# Patient Record
Sex: MALE | Race: WHITE | NOT HISPANIC OR LATINO
[De-identification: names, ages, dates, MRNs, and addresses within clinical notes are randomized per-mention and may not be internally consistent; named-entity substitution may affect disease eponyms.]

---

## 2022-05-02 PROBLEM — Z00.00 ENCOUNTER FOR PREVENTIVE HEALTH EXAMINATION: Status: ACTIVE | Noted: 2022-05-02

## 2022-05-18 ENCOUNTER — APPOINTMENT (OUTPATIENT)
Dept: COLORECTAL SURGERY | Facility: CLINIC | Age: 67
End: 2022-05-18
Payer: COMMERCIAL

## 2022-05-18 VITALS
BODY MASS INDEX: 24.52 KG/M2 | RESPIRATION RATE: 14 BRPM | WEIGHT: 185 LBS | TEMPERATURE: 98.4 F | HEART RATE: 72 BPM | SYSTOLIC BLOOD PRESSURE: 148 MMHG | HEIGHT: 73 IN | DIASTOLIC BLOOD PRESSURE: 78 MMHG

## 2022-05-18 DIAGNOSIS — Z86.79 PERSONAL HISTORY OF OTHER DISEASES OF THE CIRCULATORY SYSTEM: ICD-10-CM

## 2022-05-18 DIAGNOSIS — N39.0 URINARY TRACT INFECTION, SITE NOT SPECIFIED: ICD-10-CM

## 2022-05-18 DIAGNOSIS — Z86.010 PERSONAL HISTORY OF COLONIC POLYPS: ICD-10-CM

## 2022-05-18 DIAGNOSIS — K57.30 DIVERTICULOSIS OF LARGE INTESTINE W/OUT PERFORATION OR ABSCESS W/OUT BLEEDING: ICD-10-CM

## 2022-05-18 DIAGNOSIS — N32.1 VESICOINTESTINAL FISTULA: ICD-10-CM

## 2022-05-18 DIAGNOSIS — Z82.49 FAMILY HISTORY OF ISCHEMIC HEART DISEASE AND OTHER DISEASES OF THE CIRCULATORY SYSTEM: ICD-10-CM

## 2022-05-18 DIAGNOSIS — Z72.89 OTHER PROBLEMS RELATED TO LIFESTYLE: ICD-10-CM

## 2022-05-18 DIAGNOSIS — Z87.438 PERSONAL HISTORY OF OTHER DISEASES OF MALE GENITAL ORGANS: ICD-10-CM

## 2022-05-18 DIAGNOSIS — Z87.891 PERSONAL HISTORY OF NICOTINE DEPENDENCE: ICD-10-CM

## 2022-05-18 PROCEDURE — 99204 OFFICE O/P NEW MOD 45 MIN: CPT

## 2022-05-18 RX ORDER — NEOMYCIN SULFATE 500 MG/1
500 TABLET ORAL
Qty: 3 | Refills: 0 | Status: ACTIVE | COMMUNITY
Start: 2022-05-18 | End: 1900-01-01

## 2022-05-18 RX ORDER — AMLODIPINE BESYLATE 10 MG/1
10 TABLET ORAL
Refills: 0 | Status: ACTIVE | COMMUNITY

## 2022-05-18 RX ORDER — TAMSULOSIN HYDROCHLORIDE 0.4 MG/1
0.4 CAPSULE ORAL
Refills: 0 | Status: ACTIVE | COMMUNITY

## 2022-05-18 RX ORDER — METOPROLOL SUCCINATE 200 MG/1
TABLET, EXTENDED RELEASE ORAL
Refills: 0 | Status: ACTIVE | COMMUNITY

## 2022-05-18 RX ORDER — METRONIDAZOLE 500 MG/1
500 TABLET ORAL
Qty: 3 | Refills: 0 | Status: ACTIVE | COMMUNITY
Start: 2022-05-18 | End: 1900-01-01

## 2022-05-18 RX ORDER — LOSARTAN POTASSIUM AND HYDROCHLOROTHIAZIDE 25; 100 MG/1; MG/1
100-25 TABLET ORAL
Refills: 0 | Status: ACTIVE | COMMUNITY

## 2022-05-19 NOTE — PHYSICAL EXAM
[Normal Breath Sounds] : Normal breath sounds [Normal Heart Sounds] : normal heart sounds [No Edema] : No edema [Alert] : alert [Oriented to Person] : oriented to person [Oriented to Place] : oriented to place [Oriented to Time] : oriented to time [Calm] : calm [de-identified] : flat +BS NT/ND [de-identified] : NC/AT [de-identified] : +ROM [de-identified] : intact

## 2022-05-19 NOTE — REVIEW OF SYSTEMS
[Loss Of Hearing] : hearing loss [As Noted in HPI] : as noted in HPI [Negative] : Endocrine [Chest Pain] : no chest pain [Shortness Of Breath] : no shortness of breath [Easy Bleeding] : no tendency for easy bleeding [Easy Bruising] : no tendency for easy bruising [FreeTextEntry7] : daily BM

## 2022-05-19 NOTE — ASSESSMENT
[FreeTextEntry1] : Very pleasant 67-year-old male  who presents for consultation regarding a colovesical fistula.\par \par The patient had his first bout of CAT scan proven diverticulitis approximately 8 years ago. Over the last one to one and one half years the patient developed multiple urinary tract infections. He finally sought treatment from a urologist. CAT scan was performed confirming the presence of a colovesical fistula. Cystoscopy was also performed confirming the fistula. A colonoscopy was done revealing sigmoid diverticulosis and some benign polyps,, which were removed. This was his initial colonoscopy.\par \par The patient has never had prior abdominal surgery. Past medical history is significant for hypertension.\par \par We discussed the etiology and treatment of acute diverticulitis. The patient certainly requires resection. We talked about a laparoscopic assisted approach including takedown and repair of the fistula along with a sigmoid resection. We talked about anticipated operative time, hospital stay and time to complete recuperation. Risks, alternatives and benefits including but not limited to an anastomotic leak, wound infection and deep venous thrombosis were discussed.\par \par Questions were answered and he wished to proceed with surgical scheduling.

## 2022-05-19 NOTE — HISTORY OF PRESENT ILLNESS
[FreeTextEntry1] : 68yo WM hospitalized @8yrs ago with CT proven diverticulitis. Treated with IV ABX. Discharged on oral ABX.\par \par Over the past 2yrs pt has been treated multiple times for UTIs (started with urolgy @1.5yrs ago). April 2022 urology ordered CT scan secondary to complaints of fecaluria/pneumaturia/burning on urination. This revealed a colovesical fistula. Cystoscopy performed revealing bladder abnormality. F/U colonoscopy (initial) revealed moderate left sided diverticulosis and multiple colonic polyps, which were retrieved (path benign). Advised to schedule colorectal surgical consultation.

## 2022-05-23 ENCOUNTER — OUTPATIENT (OUTPATIENT)
Dept: OUTPATIENT SERVICES | Facility: HOSPITAL | Age: 67
LOS: 1 days | End: 2022-05-23
Payer: MEDICARE

## 2022-05-23 VITALS
HEIGHT: 73 IN | RESPIRATION RATE: 16 BRPM | HEART RATE: 70 BPM | SYSTOLIC BLOOD PRESSURE: 153 MMHG | TEMPERATURE: 98 F | OXYGEN SATURATION: 96 % | WEIGHT: 177.91 LBS | DIASTOLIC BLOOD PRESSURE: 86 MMHG

## 2022-05-23 DIAGNOSIS — N32.1 VESICOINTESTINAL FISTULA: ICD-10-CM

## 2022-05-23 DIAGNOSIS — Z98.890 OTHER SPECIFIED POSTPROCEDURAL STATES: Chronic | ICD-10-CM

## 2022-05-23 DIAGNOSIS — Z01.818 ENCOUNTER FOR OTHER PREPROCEDURAL EXAMINATION: ICD-10-CM

## 2022-05-23 DIAGNOSIS — Z29.9 ENCOUNTER FOR PROPHYLACTIC MEASURES, UNSPECIFIED: ICD-10-CM

## 2022-05-23 LAB
ANION GAP SERPL CALC-SCNC: 13 MMOL/L — SIGNIFICANT CHANGE UP (ref 5–17)
BLD GP AB SCN SERPL QL: NEGATIVE — SIGNIFICANT CHANGE UP
BUN SERPL-MCNC: 14 MG/DL — SIGNIFICANT CHANGE UP (ref 7–23)
CALCIUM SERPL-MCNC: 9.8 MG/DL — SIGNIFICANT CHANGE UP (ref 8.4–10.5)
CHLORIDE SERPL-SCNC: 95 MMOL/L — LOW (ref 96–108)
CO2 SERPL-SCNC: 26 MMOL/L — SIGNIFICANT CHANGE UP (ref 22–31)
CREAT SERPL-MCNC: 0.92 MG/DL — SIGNIFICANT CHANGE UP (ref 0.5–1.3)
EGFR: 91 ML/MIN/1.73M2 — SIGNIFICANT CHANGE UP
GLUCOSE SERPL-MCNC: 96 MG/DL — SIGNIFICANT CHANGE UP (ref 70–99)
HCT VFR BLD CALC: 36.6 % — LOW (ref 39–50)
HGB BLD-MCNC: 11.7 G/DL — LOW (ref 13–17)
MCHC RBC-ENTMCNC: 25.5 PG — LOW (ref 27–34)
MCHC RBC-ENTMCNC: 32 GM/DL — SIGNIFICANT CHANGE UP (ref 32–36)
MCV RBC AUTO: 79.7 FL — LOW (ref 80–100)
NRBC # BLD: 0 /100 WBCS — SIGNIFICANT CHANGE UP (ref 0–0)
PLATELET # BLD AUTO: 325 K/UL — SIGNIFICANT CHANGE UP (ref 150–400)
POTASSIUM SERPL-MCNC: 3.6 MMOL/L — SIGNIFICANT CHANGE UP (ref 3.5–5.3)
POTASSIUM SERPL-SCNC: 3.6 MMOL/L — SIGNIFICANT CHANGE UP (ref 3.5–5.3)
RBC # BLD: 4.59 M/UL — SIGNIFICANT CHANGE UP (ref 4.2–5.8)
RBC # FLD: 16.2 % — HIGH (ref 10.3–14.5)
RH IG SCN BLD-IMP: POSITIVE — SIGNIFICANT CHANGE UP
SODIUM SERPL-SCNC: 134 MMOL/L — LOW (ref 135–145)
WBC # BLD: 8.2 K/UL — SIGNIFICANT CHANGE UP (ref 3.8–10.5)
WBC # FLD AUTO: 8.2 K/UL — SIGNIFICANT CHANGE UP (ref 3.8–10.5)

## 2022-05-23 PROCEDURE — 87186 SC STD MICRODIL/AGAR DIL: CPT

## 2022-05-23 PROCEDURE — 86900 BLOOD TYPING SEROLOGIC ABO: CPT

## 2022-05-23 PROCEDURE — 86901 BLOOD TYPING SEROLOGIC RH(D): CPT

## 2022-05-23 PROCEDURE — 80048 BASIC METABOLIC PNL TOTAL CA: CPT

## 2022-05-23 PROCEDURE — 87086 URINE CULTURE/COLONY COUNT: CPT

## 2022-05-23 PROCEDURE — 85027 COMPLETE CBC AUTOMATED: CPT

## 2022-05-23 PROCEDURE — 86850 RBC ANTIBODY SCREEN: CPT

## 2022-05-23 PROCEDURE — G0463: CPT

## 2022-05-23 PROCEDURE — 83036 HEMOGLOBIN GLYCOSYLATED A1C: CPT

## 2022-05-23 RX ORDER — CEFOTETAN DISODIUM 1 G
2 VIAL (EA) INJECTION ONCE
Refills: 0 | Status: DISCONTINUED | OUTPATIENT
Start: 2022-06-07 | End: 2022-06-07

## 2022-05-23 NOTE — H&P PST ADULT - HISTORY OF PRESENT ILLNESS
66 YO M PMH HTN, diverticulitis (proven on CT scan ~8 years ago), recurrent UTIs over the last 1-1.5 years, CT scan revealed colovesical fistula confirmed by cystoscopy, colonoscopy revealed sigmoid diverticulosis & benign polyps s/p removal, presents to [facility] for LAPAROSCOPIC LOW ANTERIOR RESECTION ERAS 6/7/2022. Denies any palpitations, SOB, N/V, Covid symptoms (fever, chills, cough) or exposure.     ***Covid test scheduled for 6/4/2022 at [facility].

## 2022-05-23 NOTE — H&P PST ADULT - PROBLEM SELECTOR PLAN 1
ERAS  LAPAROSCOPIC LOW ANTERIOR RESECTION  Pre-op education provided - all questions answered   Chlorhex soap & instructions given  CBC, CMP, T&S, Ha1c, U cx sent in PST  Continue losartan & metoprolol DOS as prescribed ERAS  LAPAROSCOPIC LOW ANTERIOR RESECTION  Pre-op education provided - all questions answered   Chlorhex soap & instructions given  Incentive spirometer & instructions provided  CBC, BMP, T&S, Ha1c, Ucx sent in PST  Continue losartan & metoprolol DOS as prescribed

## 2022-05-23 NOTE — H&P PST ADULT - FALL HARM RISK - UNIVERSAL INTERVENTIONS
Bed in lowest position, wheels locked, appropriate side rails in place/Call bell, personal items and telephone in reach/Instruct patient to call for assistance before getting out of bed or chair/Non-slip footwear when patient is out of bed/Pound to call system/Physically safe environment - no spills, clutter or unnecessary equipment/Purposeful Proactive Rounding/Room/bathroom lighting operational, light cord in reach

## 2022-05-23 NOTE — H&P PST ADULT - NSANTHBMIRD_ENT_A_CORE
TRANSFER - IN REPORT:    Verbal report received from [place] [id number] on Maurice  being received from cath lab procedure area  for routine progression of care. Report consisted of patients Situation, Background, Assessment and Recommendations(SBAR). Information from the following report(s) Kardex and Procedure Summary was reviewed with the receiving clinician. Opportunity for questions and clarification was provided. Assessment completed upon patients arrival to [address] [place] [address] and care assumed. Cardiac Cath Lab Recovery Arrival Note:    Maurice arrived to [facility] recovery area. Patient procedure= CV. Patient on cardiac monitor, non-invasive blood pressure, SPO2 monitor. On  O2 @ 2 lpm via NC.  IV  of NS on pump at 25 ml/hr. Patient status doing well without problems. Patient is A&Ox 3. Patient reports no pain. PROCEDURE SITE CHECK:    Procedure site:  none    No change in patient status. Continue to monitor patient and status. No

## 2022-05-23 NOTE — H&P PST ADULT - NSANTHOSAYNRD_GEN_A_CORE
No. GERMÁN screening performed.  STOP BANG Legend: 0-2 = LOW Risk; 3-4 = INTERMEDIATE Risk; 5-8 = HIGH Risk

## 2022-05-23 NOTE — H&P PST ADULT - NSICDXPASTMEDICALHX_GEN_ALL_CORE_FT
PAST MEDICAL HISTORY:  Colonic polyp     Hemorrhoid     History of BPH     History of recurrent UTIs     HTN (hypertension)

## 2022-05-23 NOTE — H&P PST ADULT - PRIMARY CARE PROVIDER
Yoselyn Adan  Yoselyn Adan  - preop eval 5/28/2022 (to be faxed to [facility])

## 2022-05-23 NOTE — H&P PST ADULT - ASSESSMENT
LUCYI VTE 2.0 SCORE [CLOT updated 2019]    AGE RELATED RISK FACTORS                                                       MOBILITY RELATED FACTORS  [ ] Age 41-60 years                                            (1 Point)                    [ ] Bed rest                                                        (1 Point)  [ ] Age: 61-74 years                                           (2 Points)                  [ ] Plaster cast                                                   (2 Points)  [ ] Age= 75 years                                              (3 Points)                    [ ] Bed bound for more than 72 hours                 (2 Points)    DISEASE RELATED RISK FACTORS                                               GENDER SPECIFIC FACTORS  [ ] Edema in the lower extremities                       (1 Point)              [ ] Pregnancy                                                     (1 Point)  [ ] Varicose veins                                               (1 Point)                     [ ] Post-partum < 6 weeks                                   (1 Point)             [ ] BMI > 25 Kg/m2                                            (1 Point)                     [ ] Hormonal therapy  or oral contraception          (1 Point)                 [ ] Sepsis (in the previous month)                        (1 Point)               [ ] History of pregnancy complications                 (1 point)  [ ] Pneumonia or serious lung disease                                               [ ] Unexplained or recurrent                     (1 Point)           (in the previous month)                               (1 Point)  [ ] Abnormal pulmonary function test                     (1 Point)                 SURGERY RELATED RISK FACTORS  [ ] Acute myocardial infarction                              (1 Point)               [ ]  Section                                             (1 Point)  [ ] Congestive heart failure (in the previous month)  (1 Point)      [ ] Minor surgery                                                  (1 Point)   [ ] Inflammatory bowel disease                             (1 Point)               [ ] Arthroscopic surgery                                        (2 Points)  [ ] Central venous access                                      (2 Points)                [ ] General surgery lasting more than 45 minutes (2 points)  [ ] Malignancy- Present or previous                   (2 Points)                [ ] Elective arthroplasty                                         (5 points)    [ ] Stroke (in the previous month)                          (5 Points)                                                                                                                                                           HEMATOLOGY RELATED FACTORS                                                 TRAUMA RELATED RISK FACTORS  [ ] Prior episodes of VTE                                     (3 Points)                [ ] Fracture of the hip, pelvis, or leg                       (5 Points)  [ ] Positive family history for VTE                         (3 Points)             [ ] Acute spinal cord injury (in the previous month)  (5 Points)  [ ] Prothrombin 58755 A                                     (3 Points)               [ ] Paralysis  (less than 1 month)                             (5 Points)  [ ] Factor V Leiden                                             (3 Points)                  [ ] Multiple Trauma within 1 month                        (5 Points)  [ ] Lupus anticoagulants                                     (3 Points)                                                           [ ] Anticardiolipin antibodies                               (3 Points)                                                       [ ] High homocysteine in the blood                      (3 Points)                                             [ ] Other congenital or acquired thrombophilia      (3 Points)                                                [ ] Heparin induced thrombocytopenia                  (3 Points)                                     Total Score [          ] LUCYI VTE 2.0 SCORE [CLOT updated 2019]    AGE RELATED RISK FACTORS                                                       MOBILITY RELATED FACTORS  [ ] Age 41-60 years                                            (1 Point)                    [ ] Bed rest                                                        (1 Point)  [2 ] Age: 61-74 years                                           (2 Points)                  [ ] Plaster cast                                                   (2 Points)  [ ] Age= 75 years                                              (3 Points)                    [ ] Bed bound for more than 72 hours                 (2 Points)    DISEASE RELATED RISK FACTORS                                               GENDER SPECIFIC FACTORS  [ ] Edema in the lower extremities                       (1 Point)              [ ] Pregnancy                                                     (1 Point)  [ ] Varicose veins                                               (1 Point)                     [ ] Post-partum < 6 weeks                                   (1 Point)             [ ] BMI > 25 Kg/m2                                            (1 Point)                     [ ] Hormonal therapy  or oral contraception          (1 Point)                 [ ] Sepsis (in the previous month)                        (1 Point)               [ ] History of pregnancy complications                 (1 point)  [ ] Pneumonia or serious lung disease                                               [ ] Unexplained or recurrent                     (1 Point)           (in the previous month)                               (1 Point)  [ ] Abnormal pulmonary function test                     (1 Point)                 SURGERY RELATED RISK FACTORS  [ ] Acute myocardial infarction                              (1 Point)               [ ]  Section                                             (1 Point)  [ ] Congestive heart failure (in the previous month)  (1 Point)      [ ] Minor surgery                                                  (1 Point)   [ ] Inflammatory bowel disease                             (1 Point)               [ ] Arthroscopic surgery                                        (2 Points)  [ ] Central venous access                                      (2 Points)                [ ] General surgery lasting more than 45 minutes (2 points)  [ ] Malignancy- Present or previous                   (2 Points)                [ ] Elective arthroplasty                                         (5 points)    [ ] Stroke (in the previous month)                          (5 Points)                                                                                                                                                           HEMATOLOGY RELATED FACTORS                                                 TRAUMA RELATED RISK FACTORS  [ ] Prior episodes of VTE                                     (3 Points)                [ ] Fracture of the hip, pelvis, or leg                       (5 Points)  [ ] Positive family history for VTE                         (3 Points)             [ ] Acute spinal cord injury (in the previous month)  (5 Points)  [ ] Prothrombin 02360 A                                     (3 Points)               [ ] Paralysis  (less than 1 month)                             (5 Points)  [ ] Factor V Leiden                                             (3 Points)                  [ ] Multiple Trauma within 1 month                        (5 Points)  [ ] Lupus anticoagulants                                     (3 Points)                                                           [ ] Anticardiolipin antibodies                               (3 Points)                                                       [ ] High homocysteine in the blood                      (3 Points)                                             [ ] Other congenital or acquired thrombophilia      (3 Points)                                                [ ] Heparin induced thrombocytopenia                  (3 Points)                                     Total Score [          ] LUCYI VTE 2.0 SCORE [CLOT updated 2019]    AGE RELATED RISK FACTORS                                                       MOBILITY RELATED FACTORS  [ ] Age 41-60 years                                            (1 Point)                    [ ] Bed rest                                                        (1 Point)  [2 ] Age: 61-74 years                                           (2 Points)                  [ ] Plaster cast                                                   (2 Points)  [ ] Age= 75 years                                              (3 Points)                    [ ] Bed bound for more than 72 hours                 (2 Points)    DISEASE RELATED RISK FACTORS                                               GENDER SPECIFIC FACTORS  [ ] Edema in the lower extremities                       (1 Point)              [ ] Pregnancy                                                     (1 Point)  [ ] Varicose veins                                               (1 Point)                     [ ] Post-partum < 6 weeks                                   (1 Point)             [ ] BMI > 25 Kg/m2                                            (1 Point)                     [ ] Hormonal therapy  or oral contraception          (1 Point)                 [ ] Sepsis (in the previous month)                        (1 Point)               [ ] History of pregnancy complications                 (1 point)  [ ] Pneumonia or serious lung disease                                               [ ] Unexplained or recurrent                     (1 Point)           (in the previous month)                               (1 Point)  [ ] Abnormal pulmonary function test                     (1 Point)                 SURGERY RELATED RISK FACTORS  [ ] Acute myocardial infarction                              (1 Point)               [ ]  Section                                             (1 Point)  [ ] Congestive heart failure (in the previous month)  (1 Point)      [ ] Minor surgery                                                  (1 Point)   [ ] Inflammatory bowel disease                             (1 Point)               [ ] Arthroscopic surgery                                        (2 Points)  [ ] Central venous access                                      (2 Points)                [2 ] General surgery lasting more than 45 minutes (2 points)  [ ] Malignancy- Present or previous                   (2 Points)                [ ] Elective arthroplasty                                         (5 points)    [ ] Stroke (in the previous month)                          (5 Points)                                                                                                                                                           HEMATOLOGY RELATED FACTORS                                                 TRAUMA RELATED RISK FACTORS  [ ] Prior episodes of VTE                                     (3 Points)                [ ] Fracture of the hip, pelvis, or leg                       (5 Points)  [ ] Positive family history for VTE                         (3 Points)             [ ] Acute spinal cord injury (in the previous month)  (5 Points)  [ ] Prothrombin 76611 A                                     (3 Points)               [ ] Paralysis  (less than 1 month)                             (5 Points)  [ ] Factor V Leiden                                             (3 Points)                  [ ] Multiple Trauma within 1 month                        (5 Points)  [ ] Lupus anticoagulants                                     (3 Points)                                                           [ ] Anticardiolipin antibodies                               (3 Points)                                                       [ ] High homocysteine in the blood                      (3 Points)                                             [ ] Other congenital or acquired thrombophilia      (3 Points)                                                [ ] Heparin induced thrombocytopenia                  (3 Points)                                     Total Score [         4 ]

## 2022-05-23 NOTE — H&P PST ADULT - NSICDXPASTSURGICALHX_GEN_ALL_CORE_FT
PAST SURGICAL HISTORY:  H/O colonoscopy     H/O laceration repair 45 stitches forehead @ 4 years old

## 2022-05-24 LAB
A1C WITH ESTIMATED AVERAGE GLUCOSE RESULT: 6.1 % — HIGH (ref 4–5.6)
ESTIMATED AVERAGE GLUCOSE: 128 MG/DL — HIGH (ref 68–114)

## 2022-06-04 ENCOUNTER — OUTPATIENT (OUTPATIENT)
Dept: OUTPATIENT SERVICES | Facility: HOSPITAL | Age: 67
LOS: 1 days | End: 2022-06-04

## 2022-06-04 DIAGNOSIS — Z98.890 OTHER SPECIFIED POSTPROCEDURAL STATES: Chronic | ICD-10-CM

## 2022-06-04 DIAGNOSIS — Z11.52 ENCOUNTER FOR SCREENING FOR COVID-19: ICD-10-CM

## 2022-06-04 LAB — SARS-COV-2 RNA SPEC QL NAA+PROBE: SIGNIFICANT CHANGE UP

## 2022-06-06 ENCOUNTER — TRANSCRIPTION ENCOUNTER (OUTPATIENT)
Age: 67
End: 2022-06-06

## 2022-06-07 ENCOUNTER — RESULT REVIEW (OUTPATIENT)
Age: 67
End: 2022-06-07

## 2022-06-07 ENCOUNTER — APPOINTMENT (OUTPATIENT)
Dept: COLORECTAL SURGERY | Facility: HOSPITAL | Age: 67
End: 2022-06-07
Payer: MEDICARE

## 2022-06-07 ENCOUNTER — INPATIENT (INPATIENT)
Facility: HOSPITAL | Age: 67
LOS: 1 days | Discharge: HOME | DRG: 660 | End: 2022-06-09
Attending: SURGERY | Admitting: SURGERY
Payer: MEDICARE

## 2022-06-07 VITALS
SYSTOLIC BLOOD PRESSURE: 149 MMHG | RESPIRATION RATE: 18 BRPM | TEMPERATURE: 98 F | DIASTOLIC BLOOD PRESSURE: 83 MMHG | HEIGHT: 72.99 IN | WEIGHT: 177.91 LBS | HEART RATE: 72 BPM | OXYGEN SATURATION: 100 %

## 2022-06-07 DIAGNOSIS — Z98.890 OTHER SPECIFIED POSTPROCEDURAL STATES: Chronic | ICD-10-CM

## 2022-06-07 DIAGNOSIS — N32.1 VESICOINTESTINAL FISTULA: ICD-10-CM

## 2022-06-07 LAB
ANION GAP SERPL CALC-SCNC: 17 MMOL/L — SIGNIFICANT CHANGE UP (ref 5–17)
BUN SERPL-MCNC: 18 MG/DL — SIGNIFICANT CHANGE UP (ref 7–23)
CALCIUM SERPL-MCNC: 8.7 MG/DL — SIGNIFICANT CHANGE UP (ref 8.4–10.5)
CHLORIDE SERPL-SCNC: 98 MMOL/L — SIGNIFICANT CHANGE UP (ref 96–108)
CO2 SERPL-SCNC: 23 MMOL/L — SIGNIFICANT CHANGE UP (ref 22–31)
CREAT SERPL-MCNC: 1.1 MG/DL — SIGNIFICANT CHANGE UP (ref 0.5–1.3)
EGFR: 73 ML/MIN/1.73M2 — SIGNIFICANT CHANGE UP
GLUCOSE BLDC GLUCOMTR-MCNC: 116 MG/DL — HIGH (ref 70–99)
GLUCOSE SERPL-MCNC: 172 MG/DL — HIGH (ref 70–99)
HCT VFR BLD CALC: 39.5 % — SIGNIFICANT CHANGE UP (ref 39–50)
HGB BLD-MCNC: 12.6 G/DL — LOW (ref 13–17)
MAGNESIUM SERPL-MCNC: 2.1 MG/DL — SIGNIFICANT CHANGE UP (ref 1.6–2.6)
MCHC RBC-ENTMCNC: 26.5 PG — LOW (ref 27–34)
MCHC RBC-ENTMCNC: 31.9 GM/DL — LOW (ref 32–36)
MCV RBC AUTO: 83.2 FL — SIGNIFICANT CHANGE UP (ref 80–100)
NRBC # BLD: 0 /100 WBCS — SIGNIFICANT CHANGE UP (ref 0–0)
PHOSPHATE SERPL-MCNC: 3.9 MG/DL — SIGNIFICANT CHANGE UP (ref 2.5–4.5)
PLATELET # BLD AUTO: 283 K/UL — SIGNIFICANT CHANGE UP (ref 150–400)
POTASSIUM SERPL-MCNC: 3.3 MMOL/L — LOW (ref 3.5–5.3)
POTASSIUM SERPL-SCNC: 3.3 MMOL/L — LOW (ref 3.5–5.3)
RBC # BLD: 4.75 M/UL — SIGNIFICANT CHANGE UP (ref 4.2–5.8)
RBC # FLD: 15.9 % — HIGH (ref 10.3–14.5)
RH IG SCN BLD-IMP: POSITIVE — SIGNIFICANT CHANGE UP
SODIUM SERPL-SCNC: 138 MMOL/L — SIGNIFICANT CHANGE UP (ref 135–145)
WBC # BLD: 16.5 K/UL — HIGH (ref 3.8–10.5)
WBC # FLD AUTO: 16.5 K/UL — HIGH (ref 3.8–10.5)

## 2022-06-07 PROCEDURE — 44207 L COLECTOMY/COLOPROCTOSTOMY: CPT

## 2022-06-07 PROCEDURE — 88307 TISSUE EXAM BY PATHOLOGIST: CPT | Mod: 26

## 2022-06-07 PROCEDURE — 44660 REPAIR BOWEL-BLADDER FISTULA: CPT

## 2022-06-07 PROCEDURE — 45300 PROCTOSIGMOIDOSCOPY DX: CPT

## 2022-06-07 PROCEDURE — 52005 CYSTO W/URTRL CATHJ: CPT

## 2022-06-07 DEVICE — VISTASEAL FIBRIN HUMAN 4ML: Type: IMPLANTABLE DEVICE | Status: FUNCTIONAL

## 2022-06-07 DEVICE — IMPLANTABLE DEVICE: Type: IMPLANTABLE DEVICE | Status: FUNCTIONAL

## 2022-06-07 DEVICE — STAPLER COVIDIEN PURSTRING 65MM: Type: IMPLANTABLE DEVICE | Status: FUNCTIONAL

## 2022-06-07 DEVICE — GUIDEWIRE SENSOR DUAL-FLEX NITINOL STRAIGHT .035" X 150CM: Type: IMPLANTABLE DEVICE | Status: FUNCTIONAL

## 2022-06-07 DEVICE — STAPLER CONTOUR CURVED WITH BLUE CART: Type: IMPLANTABLE DEVICE | Status: FUNCTIONAL

## 2022-06-07 DEVICE — URETERAL CATH OPEN END 5FR 70CM: Type: IMPLANTABLE DEVICE | Status: FUNCTIONAL

## 2022-06-07 RX ORDER — MORPHINE SULFATE 50 MG/1
0.2 CAPSULE, EXTENDED RELEASE ORAL ONCE
Refills: 0 | Status: DISCONTINUED | OUTPATIENT
Start: 2022-06-07 | End: 2022-06-07

## 2022-06-07 RX ORDER — POTASSIUM CHLORIDE 20 MEQ
10 PACKET (EA) ORAL
Refills: 0 | Status: COMPLETED | OUTPATIENT
Start: 2022-06-07 | End: 2022-06-08

## 2022-06-07 RX ORDER — ACETAMINOPHEN 500 MG
1000 TABLET ORAL ONCE
Refills: 0 | Status: COMPLETED | OUTPATIENT
Start: 2022-06-07 | End: 2022-06-07

## 2022-06-07 RX ORDER — TAMSULOSIN HYDROCHLORIDE 0.4 MG/1
0.4 CAPSULE ORAL AT BEDTIME
Refills: 0 | Status: DISCONTINUED | OUTPATIENT
Start: 2022-06-07 | End: 2022-06-09

## 2022-06-07 RX ORDER — OXYCODONE HYDROCHLORIDE 5 MG/1
5 TABLET ORAL
Refills: 0 | Status: DISCONTINUED | OUTPATIENT
Start: 2022-06-07 | End: 2022-06-07

## 2022-06-07 RX ORDER — AMLODIPINE BESYLATE 2.5 MG/1
10 TABLET ORAL DAILY
Refills: 0 | Status: DISCONTINUED | OUTPATIENT
Start: 2022-06-07 | End: 2022-06-09

## 2022-06-07 RX ORDER — SODIUM CHLORIDE 9 MG/ML
3 INJECTION INTRAMUSCULAR; INTRAVENOUS; SUBCUTANEOUS EVERY 8 HOURS
Refills: 0 | Status: DISCONTINUED | OUTPATIENT
Start: 2022-06-07 | End: 2022-06-07

## 2022-06-07 RX ORDER — NALOXONE HYDROCHLORIDE 4 MG/.1ML
0.1 SPRAY NASAL
Refills: 0 | Status: DISCONTINUED | OUTPATIENT
Start: 2022-06-07 | End: 2022-06-09

## 2022-06-07 RX ORDER — OXYCODONE HYDROCHLORIDE 5 MG/1
10 TABLET ORAL
Refills: 0 | Status: DISCONTINUED | OUTPATIENT
Start: 2022-06-07 | End: 2022-06-07

## 2022-06-07 RX ORDER — OXYCODONE HYDROCHLORIDE 5 MG/1
5 TABLET ORAL ONCE
Refills: 0 | Status: DISCONTINUED | OUTPATIENT
Start: 2022-06-07 | End: 2022-06-07

## 2022-06-07 RX ORDER — KETOROLAC TROMETHAMINE 30 MG/ML
15 SYRINGE (ML) INJECTION EVERY 6 HOURS
Refills: 0 | Status: DISCONTINUED | OUTPATIENT
Start: 2022-06-07 | End: 2022-06-08

## 2022-06-07 RX ORDER — METOPROLOL TARTRATE 50 MG
150 TABLET ORAL
Refills: 0 | Status: DISCONTINUED | OUTPATIENT
Start: 2022-06-07 | End: 2022-06-09

## 2022-06-07 RX ORDER — SODIUM CHLORIDE 9 MG/ML
1000 INJECTION, SOLUTION INTRAVENOUS
Refills: 0 | Status: DISCONTINUED | OUTPATIENT
Start: 2022-06-07 | End: 2022-06-07

## 2022-06-07 RX ORDER — LOSARTAN/HYDROCHLOROTHIAZIDE 100MG-25MG
1 TABLET ORAL
Qty: 0 | Refills: 0 | DISCHARGE

## 2022-06-07 RX ORDER — TAMSULOSIN HYDROCHLORIDE 0.4 MG/1
1 CAPSULE ORAL
Qty: 0 | Refills: 0 | DISCHARGE

## 2022-06-07 RX ORDER — ACETAMINOPHEN 500 MG
1000 TABLET ORAL EVERY 6 HOURS
Refills: 0 | Status: COMPLETED | OUTPATIENT
Start: 2022-06-07 | End: 2022-06-08

## 2022-06-07 RX ORDER — CHLORHEXIDINE GLUCONATE 213 G/1000ML
1 SOLUTION TOPICAL ONCE
Refills: 0 | Status: DISCONTINUED | OUTPATIENT
Start: 2022-06-07 | End: 2022-06-07

## 2022-06-07 RX ORDER — OXYCODONE HYDROCHLORIDE 5 MG/1
5 TABLET ORAL EVERY 4 HOURS
Refills: 0 | Status: DISCONTINUED | OUTPATIENT
Start: 2022-06-07 | End: 2022-06-09

## 2022-06-07 RX ORDER — AMLODIPINE BESYLATE 2.5 MG/1
1 TABLET ORAL
Qty: 0 | Refills: 0 | DISCHARGE

## 2022-06-07 RX ORDER — LIDOCAINE HCL 20 MG/ML
0.2 VIAL (ML) INJECTION ONCE
Refills: 0 | Status: DISCONTINUED | OUTPATIENT
Start: 2022-06-07 | End: 2022-06-07

## 2022-06-07 RX ORDER — METOPROLOL TARTRATE 50 MG
150 TABLET ORAL
Qty: 0 | Refills: 0 | DISCHARGE

## 2022-06-07 RX ORDER — SODIUM CHLORIDE 9 MG/ML
1000 INJECTION, SOLUTION INTRAVENOUS
Refills: 0 | Status: DISCONTINUED | OUTPATIENT
Start: 2022-06-07 | End: 2022-06-08

## 2022-06-07 RX ORDER — GABAPENTIN 400 MG/1
600 CAPSULE ORAL ONCE
Refills: 0 | Status: COMPLETED | OUTPATIENT
Start: 2022-06-07 | End: 2022-06-07

## 2022-06-07 RX ORDER — OXYCODONE HYDROCHLORIDE 5 MG/1
2.5 TABLET ORAL EVERY 4 HOURS
Refills: 0 | Status: DISCONTINUED | OUTPATIENT
Start: 2022-06-07 | End: 2022-06-09

## 2022-06-07 RX ORDER — OXYCODONE HYDROCHLORIDE 5 MG/1
10 TABLET ORAL ONCE
Refills: 0 | Status: DISCONTINUED | OUTPATIENT
Start: 2022-06-07 | End: 2022-06-07

## 2022-06-07 RX ORDER — CELECOXIB 200 MG/1
400 CAPSULE ORAL ONCE
Refills: 0 | Status: COMPLETED | OUTPATIENT
Start: 2022-06-07 | End: 2022-06-07

## 2022-06-07 RX ORDER — HEPARIN SODIUM 5000 [USP'U]/ML
5000 INJECTION INTRAVENOUS; SUBCUTANEOUS EVERY 8 HOURS
Refills: 0 | Status: DISCONTINUED | OUTPATIENT
Start: 2022-06-07 | End: 2022-06-09

## 2022-06-07 RX ADMIN — Medication 1000 MILLIGRAM(S): at 21:30

## 2022-06-07 RX ADMIN — Medication 100 MILLIEQUIVALENT(S): at 22:59

## 2022-06-07 RX ADMIN — CELECOXIB 400 MILLIGRAM(S): 200 CAPSULE ORAL at 11:55

## 2022-06-07 RX ADMIN — SODIUM CHLORIDE 40 MILLILITER(S): 9 INJECTION, SOLUTION INTRAVENOUS at 20:05

## 2022-06-07 RX ADMIN — OXYCODONE HYDROCHLORIDE 5 MILLIGRAM(S): 5 TABLET ORAL at 20:05

## 2022-06-07 RX ADMIN — TAMSULOSIN HYDROCHLORIDE 0.4 MILLIGRAM(S): 0.4 CAPSULE ORAL at 21:30

## 2022-06-07 RX ADMIN — SODIUM CHLORIDE 40 MILLILITER(S): 9 INJECTION, SOLUTION INTRAVENOUS at 20:41

## 2022-06-07 RX ADMIN — Medication 400 MILLIGRAM(S): at 21:15

## 2022-06-07 RX ADMIN — HEPARIN SODIUM 5000 UNIT(S): 5000 INJECTION INTRAVENOUS; SUBCUTANEOUS at 21:30

## 2022-06-07 RX ADMIN — GABAPENTIN 600 MILLIGRAM(S): 400 CAPSULE ORAL at 11:54

## 2022-06-07 RX ADMIN — OXYCODONE HYDROCHLORIDE 5 MILLIGRAM(S): 5 TABLET ORAL at 21:00

## 2022-06-07 RX ADMIN — Medication 100 MILLIEQUIVALENT(S): at 21:30

## 2022-06-07 NOTE — CHART NOTE - NSCHARTNOTEFT_GEN_A_CORE
POST-OPERATIVE NOTE    Subjective:  Patient is s/p laparoscopic low anterior resection with colovesicular fistula repair. States pain is well controlled, denies nausea or vomiting. Recovering appropriately.     Vital Signs Last 24 Hrs  T(C): 36.8 (07 Jun 2022 23:37), Max: 36.8 (07 Jun 2022 23:37)  T(F): 98.3 (07 Jun 2022 23:37), Max: 98.3 (07 Jun 2022 23:37)  HR: 79 (07 Jun 2022 23:37) (72 - 80)  BP: 128/74 (07 Jun 2022 23:37) (111/65 - 149/83)  BP(mean): 96 (07 Jun 2022 22:18) (84 - 98)  RR: 18 (07 Jun 2022 23:37) (14 - 18)  SpO2: 96% (07 Jun 2022 23:37) (92% - 100%)  I&O's Detail    07 Jun 2022 07:01  -  07 Jun 2022 23:46  --------------------------------------------------------  IN:    IV PiggyBack: 100 mL    Lactated Ringers: 120 mL    Oral Fluid: 70 mL  Total IN: 290 mL    OUT:    Indwelling Catheter - Urethral (mL): 280 mL  Total OUT: 280 mL    Total NET: 10 mL        amLODIPine   Tablet 10  heparin   Injectable 5000  metoprolol tartrate 150  tamsulosin 0.4    PAST MEDICAL & SURGICAL HISTORY:  HTN (hypertension)      History of BPH      History of recurrent UTIs      Colonic polyp      Hemorrhoid      H/O laceration repair  45 stitches forehead @ 4 years old      H/O colonoscopy            Physical Exam:  General: NAD, resting comfortably in bed  Abdominal: soft, mild TTP, ND, midline dressings c/d/i        LABS:                        12.6   16.50 )-----------( 283      ( 07 Jun 2022 20:37 )             39.5     06-07    138  |  98  |  18  ----------------------------<  172<H>  3.3<L>   |  23  |  1.10    Ca    8.7      07 Jun 2022 20:37  Phos  3.9     06-07  Mg     2.1     06-07        CAPILLARY BLOOD GLUCOSE      POCT Blood Glucose.: 116 mg/dL (07 Jun 2022 11:13)      Radiology and Additional Studies:    Assessment:  The patient is a 67y Male who is now several hours post-op from a laparoscopic low anterior resection with colovesicular fistula repair    Plan:  - Maintaing finley for 5 days  - Pain control as needed  - DVT ppx  - OOB and ambulating as tolerated  - F/u AM labs    Red Team Surgery   [id number]

## 2022-06-07 NOTE — BRIEF OPERATIVE NOTE - NSICDXBRIEFPOSTOP_GEN_ALL_CORE_FT
POST-OP DIAGNOSIS:  Diverticulitis 07-Jun-2022 21:08:23  Braxton Lobo  Colovesical fistula 07-Jun-2022 21:08:30  Braxton Lobo

## 2022-06-07 NOTE — BRIEF OPERATIVE NOTE - NSICDXBRIEFPREOP_GEN_ALL_CORE_FT
PRE-OP DIAGNOSIS:  Diverticulitis 07-Jun-2022 21:08:14  Braxton Lobo  Colovesical fistula 07-Jun-2022 21:08:39  Braxton Lobo

## 2022-06-07 NOTE — BRIEF OPERATIVE NOTE - NSICDXBRIEFPROCEDURE_GEN_ALL_CORE_FT
PROCEDURES:  Laparoscopic low anterior resection 07-Jun-2022 21:07:52  Braxton Lobo  Repair, fistula, colovesical 07-Jun-2022 21:08:01  Braxton Lobo

## 2022-06-07 NOTE — PATIENT PROFILE ADULT - BRAND OF COVID-19 VACCINATION
Pfizer dose 1 and 2 Purse String (Simple) Text: Given the location of the defect and the characteristics of the surrounding skin a purse string simple closure was deemed most appropriate.  Undermining was performed circumferentially around the surgical defect.  A purse string suture was then placed and tightened.

## 2022-06-07 NOTE — BRIEF OPERATIVE NOTE - OPERATION/FINDINGS
Low anterior resection performed with a colovesical fistula takedown.  There was methylene blue that leaked from the fistula at the bladder site, which was then sutured with 0 chromic 2 figure of eight stitches.  There was no leak from the methylene blue after closure.  The colorectal anastamosis was performed with a 33mm EEA at 14cm from the anal verge with negative leak test.  Left u-cath left in at the end of the case.

## 2022-06-07 NOTE — PATIENT PROFILE ADULT - NSPROPTRIGHTCAREGIVER_GEN_A_NUR
December 3, 2018     Patient: Callie Hancock   YOB: 2007   Date of Visit: 12/3/2018       To Whom it May Concern:    Callie Hancock is under my professional care  She was seen in my office on 12/3/2018  She may return to school on 12/4/2018  If you have any questions or concerns, please don't hesitate to call           Sincerely,          ERICA Bernal        CC: No Recipients
no

## 2022-06-08 ENCOUNTER — TRANSCRIPTION ENCOUNTER (OUTPATIENT)
Age: 67
End: 2022-06-08

## 2022-06-08 LAB
ANION GAP SERPL CALC-SCNC: 16 MMOL/L — SIGNIFICANT CHANGE UP (ref 5–17)
BUN SERPL-MCNC: 19 MG/DL — SIGNIFICANT CHANGE UP (ref 7–23)
CALCIUM SERPL-MCNC: 8.7 MG/DL — SIGNIFICANT CHANGE UP (ref 8.4–10.5)
CHLORIDE SERPL-SCNC: 97 MMOL/L — SIGNIFICANT CHANGE UP (ref 96–108)
CO2 SERPL-SCNC: 23 MMOL/L — SIGNIFICANT CHANGE UP (ref 22–31)
CREAT SERPL-MCNC: 1.03 MG/DL — SIGNIFICANT CHANGE UP (ref 0.5–1.3)
EGFR: 79 ML/MIN/1.73M2 — SIGNIFICANT CHANGE UP
GLUCOSE BLDC GLUCOMTR-MCNC: 155 MG/DL — HIGH (ref 70–99)
GLUCOSE SERPL-MCNC: 125 MG/DL — HIGH (ref 70–99)
HCT VFR BLD CALC: 34.5 % — LOW (ref 39–50)
HGB BLD-MCNC: 11.2 G/DL — LOW (ref 13–17)
MAGNESIUM SERPL-MCNC: 2.1 MG/DL — SIGNIFICANT CHANGE UP (ref 1.6–2.6)
MCHC RBC-ENTMCNC: 26.2 PG — LOW (ref 27–34)
MCHC RBC-ENTMCNC: 32.5 GM/DL — SIGNIFICANT CHANGE UP (ref 32–36)
MCV RBC AUTO: 80.6 FL — SIGNIFICANT CHANGE UP (ref 80–100)
NRBC # BLD: 0 /100 WBCS — SIGNIFICANT CHANGE UP (ref 0–0)
PHOSPHATE SERPL-MCNC: 4.7 MG/DL — HIGH (ref 2.5–4.5)
PLATELET # BLD AUTO: 315 K/UL — SIGNIFICANT CHANGE UP (ref 150–400)
POTASSIUM SERPL-MCNC: 3.4 MMOL/L — LOW (ref 3.5–5.3)
POTASSIUM SERPL-SCNC: 3.4 MMOL/L — LOW (ref 3.5–5.3)
RBC # BLD: 4.28 M/UL — SIGNIFICANT CHANGE UP (ref 4.2–5.8)
RBC # FLD: 16.2 % — HIGH (ref 10.3–14.5)
SODIUM SERPL-SCNC: 136 MMOL/L — SIGNIFICANT CHANGE UP (ref 135–145)
WBC # BLD: 13.23 K/UL — HIGH (ref 3.8–10.5)
WBC # FLD AUTO: 13.23 K/UL — HIGH (ref 3.8–10.5)

## 2022-06-08 RX ORDER — INFLUENZA VIRUS VACCINE 15; 15; 15; 15 UG/.5ML; UG/.5ML; UG/.5ML; UG/.5ML
0.7 SUSPENSION INTRAMUSCULAR ONCE
Refills: 0 | Status: DISCONTINUED | OUTPATIENT
Start: 2022-06-08 | End: 2022-06-09

## 2022-06-08 RX ORDER — IBUPROFEN 200 MG
400 TABLET ORAL EVERY 6 HOURS
Refills: 0 | Status: DISCONTINUED | OUTPATIENT
Start: 2022-06-09 | End: 2022-06-09

## 2022-06-08 RX ORDER — POTASSIUM CHLORIDE 20 MEQ
40 PACKET (EA) ORAL ONCE
Refills: 0 | Status: COMPLETED | OUTPATIENT
Start: 2022-06-08 | End: 2022-06-08

## 2022-06-08 RX ORDER — ACETAMINOPHEN 500 MG
1000 TABLET ORAL EVERY 6 HOURS
Refills: 0 | Status: DISCONTINUED | OUTPATIENT
Start: 2022-06-09 | End: 2022-06-09

## 2022-06-08 RX ORDER — MAGNESIUM OXIDE 400 MG ORAL TABLET 241.3 MG
1000 TABLET ORAL
Refills: 0 | Status: DISCONTINUED | OUTPATIENT
Start: 2022-06-08 | End: 2022-06-09

## 2022-06-08 RX ADMIN — Medication 15 MILLIGRAM(S): at 17:52

## 2022-06-08 RX ADMIN — Medication 400 MILLIGRAM(S): at 03:01

## 2022-06-08 RX ADMIN — Medication 400 MILLIGRAM(S): at 15:30

## 2022-06-08 RX ADMIN — Medication 40 MILLIEQUIVALENT(S): at 09:15

## 2022-06-08 RX ADMIN — HEPARIN SODIUM 5000 UNIT(S): 5000 INJECTION INTRAVENOUS; SUBCUTANEOUS at 23:40

## 2022-06-08 RX ADMIN — Medication 400 MILLIGRAM(S): at 09:07

## 2022-06-08 RX ADMIN — Medication 15 MILLIGRAM(S): at 12:17

## 2022-06-08 RX ADMIN — HEPARIN SODIUM 5000 UNIT(S): 5000 INJECTION INTRAVENOUS; SUBCUTANEOUS at 15:58

## 2022-06-08 RX ADMIN — Medication 400 MILLIGRAM(S): at 23:42

## 2022-06-08 RX ADMIN — MAGNESIUM OXIDE 400 MG ORAL TABLET 1000 MILLIGRAM(S): 241.3 TABLET ORAL at 17:34

## 2022-06-08 RX ADMIN — Medication 15 MILLIGRAM(S): at 05:29

## 2022-06-08 RX ADMIN — Medication 1000 MILLIGRAM(S): at 09:30

## 2022-06-08 RX ADMIN — Medication 400 MILLIGRAM(S): at 20:24

## 2022-06-08 RX ADMIN — Medication 150 MILLIGRAM(S): at 06:17

## 2022-06-08 RX ADMIN — Medication 100 MILLIEQUIVALENT(S): at 00:15

## 2022-06-08 RX ADMIN — AMLODIPINE BESYLATE 10 MILLIGRAM(S): 2.5 TABLET ORAL at 23:40

## 2022-06-08 RX ADMIN — Medication 15 MILLIGRAM(S): at 17:35

## 2022-06-08 RX ADMIN — TAMSULOSIN HYDROCHLORIDE 0.4 MILLIGRAM(S): 0.4 CAPSULE ORAL at 23:40

## 2022-06-08 RX ADMIN — Medication 15 MILLIGRAM(S): at 01:39

## 2022-06-08 RX ADMIN — Medication 15 MILLIGRAM(S): at 13:00

## 2022-06-08 RX ADMIN — Medication 150 MILLIGRAM(S): at 17:37

## 2022-06-08 RX ADMIN — MAGNESIUM OXIDE 400 MG ORAL TABLET 1000 MILLIGRAM(S): 241.3 TABLET ORAL at 09:07

## 2022-06-08 RX ADMIN — Medication 1000 MILLIGRAM(S): at 16:00

## 2022-06-08 RX ADMIN — HEPARIN SODIUM 5000 UNIT(S): 5000 INJECTION INTRAVENOUS; SUBCUTANEOUS at 05:28

## 2022-06-08 NOTE — DIETITIAN INITIAL EVALUATION ADULT - ADD RECOMMEND
1) Continue current low fiber diet with Ensure Surgery 1x daily. 2) Diet education provided, reinforce as needed.

## 2022-06-08 NOTE — DISCHARGE NOTE PROVIDER - NSDCCPTREATMENT_GEN_ALL_CORE_FT
PRINCIPAL PROCEDURE  Procedure: Laparoscopic low anterior resection  Findings and Treatment:       SECONDARY PROCEDURE  Procedure: Repair, fistula, colovesical  Findings and Treatment:

## 2022-06-08 NOTE — DIETITIAN INITIAL EVALUATION ADULT - PERTINENT LABORATORY DATA
06-08    136  |  97  |  19  ----------------------------<  125<H>  3.4<L>   |  23  |  1.03    Ca    8.7      08 Jun 2022 06:41  Phos  4.7     06-08  Mg     2.1     06-08    POCT Blood Glucose.: 155 mg/dL (06-08-22 @ 08:14)  A1C with Estimated Average Glucose Result: 6.1 % (05-23-22 @ 19:29)

## 2022-06-08 NOTE — PROGRESS NOTE ADULT - SUBJECTIVE AND OBJECTIVE BOX
Day 1 of Anesthesia Pain Management Service    SUBJECTIVE: Doing ok  Pain Scale Score:          [X] Refer to charted pain scores    THERAPY:  s/p  200 mcg PF morphine on 6\7\2022       MEDICATIONS  (STANDING):  acetaminophen   IVPB .. 1000 milliGRAM(s) IV Intermittent every 6 hours  amLODIPine   Tablet 10 milliGRAM(s) Oral daily  heparin   Injectable 5000 Unit(s) SubCutaneous every 8 hours  influenza  Vaccine (HIGH DOSE) 0.7 milliLiter(s) IntraMuscular once  ketorolac   Injectable 15 milliGRAM(s) IV Push every 6 hours  lactated ringers. 1000 milliLiter(s) (40 mL/Hr) IV Continuous <Continuous>  magnesium oxide 1000 milliGRAM(s) Oral two times a day with meals  metoprolol tartrate 150 milliGRAM(s) Oral two times a day  tamsulosin 0.4 milliGRAM(s) Oral at bedtime    MEDICATIONS  (PRN):  naloxone Injectable 0.1 milliGRAM(s) IV Push every 3 minutes PRN For ANY of the following changes in patient status:  A. RR LESS THAN 10 breaths per minute, B. Oxygen saturation LESS THAN 90%, C. Sedation score of 6  oxyCODONE    IR 2.5 milliGRAM(s) Oral every 4 hours PRN Moderate Pain (4 - 6)  oxyCODONE    IR 5 milliGRAM(s) Oral every 4 hours PRN Severe Pain (7 - 10)      OBJECTIVE:    Sedation:        	[X] Alert	 [ ] Drowsy	[ ] Arousable      [ ] Asleep       [ ] Unresponsive    Side Effects:	[X] None 	[ ] Nausea	[ ] Vomiting         [ ] Pruritus  		[ ] Weakness            [ ] Numbness	          [ ] Other:    Vital Signs Last 24 Hrs  T(C): 36.7 (08 Jun 2022 05:26), Max: 36.8 (07 Jun 2022 23:37)  T(F): 98.1 (08 Jun 2022 05:26), Max: 98.3 (07 Jun 2022 23:37)  HR: 74 (08 Jun 2022 05:26) (72 - 80)  BP: 144/85 (08 Jun 2022 05:26) (111/65 - 149/83)  BP(mean): 96 (07 Jun 2022 22:18) (84 - 98)  RR: 18 (08 Jun 2022 05:26) (14 - 18)  SpO2: 95% (08 Jun 2022 05:26) (92% - 100%)    ASSESSMENT/ PLAN  [X] Patient to be transitioned to prn analgesics after 24 hours  [X] Pain management per primary service, pain service to sign off   [X]Documentation and Verification of current medications

## 2022-06-08 NOTE — DIETITIAN INITIAL EVALUATION ADULT - ORAL INTAKE PTA/DIET HISTORY
Pt reports good PO intake and appetite PTA, consumes regular low sodium diet. Tries to limit intake of concentration sweets. Reports recently changing his diet to eat more whole grains and fruits/vegetables. NKFA. Pt denies chewing/swallowing difficulty, nausea, vomiting, diarrhea, constipation.

## 2022-06-08 NOTE — DISCHARGE NOTE PROVIDER - CARE PROVIDER_API CALL
Ari Foster)  ColonRectal Surgery; Surgery  [address], [address]  [place] [address]  Phone: [phone]  Fax: [phone]  Follow Up Time: 2 weeks

## 2022-06-08 NOTE — DIETITIAN INITIAL EVALUATION ADULT - PERTINENT MEDS FT
MEDICATIONS  (STANDING):  acetaminophen   IVPB .. 1000 milliGRAM(s) IV Intermittent every 6 hours  amLODIPine   Tablet 10 milliGRAM(s) Oral daily  heparin   Injectable 5000 Unit(s) SubCutaneous every 8 hours  influenza  Vaccine (HIGH DOSE) 0.7 milliLiter(s) IntraMuscular once  ketorolac   Injectable 15 milliGRAM(s) IV Push every 6 hours  lactated ringers. 1000 milliLiter(s) (40 mL/Hr) IV Continuous <Continuous>  magnesium oxide 1000 milliGRAM(s) Oral two times a day with meals  metoprolol tartrate 150 milliGRAM(s) Oral two times a day  tamsulosin 0.4 milliGRAM(s) Oral at bedtime    MEDICATIONS  (PRN):  naloxone Injectable 0.1 milliGRAM(s) IV Push every 3 minutes PRN For ANY of the following changes in patient status:  A. RR LESS THAN 10 breaths per minute, B. Oxygen saturation LESS THAN 90%, C. Sedation score of 6  oxyCODONE    IR 2.5 milliGRAM(s) Oral every 4 hours PRN Moderate Pain (4 - 6)  oxyCODONE    IR 5 milliGRAM(s) Oral every 4 hours PRN Severe Pain (7 - 10)

## 2022-06-08 NOTE — DIETITIAN INITIAL EVALUATION ADULT - REASON FOR ADMISSION
Intestinovesical fistula    Chart reviewed, events noted. This is a "67y Male who is s/p a laparoscopic low anterior resection with colovesicular fistula repair on 6/7."

## 2022-06-08 NOTE — DIETITIAN INITIAL EVALUATION ADULT - NSFNSADHERENCEPTAFT_GEN_A_CORE
No history of T2DM or prediabetes noted per chart review, pt report. HbA1c: 6.1% (5/23)--within prediabetes change.

## 2022-06-08 NOTE — DISCHARGE NOTE PROVIDER - NSDCCPCAREPLAN_GEN_ALL_CORE_FT
PRINCIPAL DISCHARGE DIAGNOSIS  Diagnosis: Vesicointestinal fistula  Assessment and Plan of Treatment: WOUND CARE: Keep incisions clean and dry.  Follow-up in office for stable removal  BATHING: Please do not submerge wound underwater. You may shower and/or sponge bathe.  ACTIVITY: No heavy lifting or straining. Otherwise, you may return to your usual level of physical activity. If you are taking narcotic pain medication (such as Percocet), do NOT drive a car, operate machinery or make important decisions.  DIET: Low fiber diet  NOTIFY YOUR SURGEON IF: You have any bleeding that does not stop, any pus draining from your wound, any fever (over 100.4 F) or chills, persistent nausea/vomiting, persistent diarrhea, or if your pain is not controlled on your discharge pain medications.  FOLLOW-UP:  1. Follow-up with Dr. Foster and your urologist within 1-2 weeks of discharge.  Please call office for appointment  2. Please follow up with your primary care physician in one week regarding your hospitalization.

## 2022-06-08 NOTE — DIETITIAN INITIAL EVALUATION ADULT - PERSON TAUGHT/METHOD
Provided low-fiber nutrition therapy including importance of avoiding  fiber rich foods, fresh fruits/vegetables, whole grains, and added fiber in processed foods. Encouraged avoidance of concentrated sweets. Discussed chewing foods well and adequate hydration and protein intake. Discussed gradual reintroduction of fiber back into diet once cleared by MD. Pt verbalized understanding and accepted written handout. Patient with no nutrition-related questions at this time. Made aware RD remains available as needed./verbal instruction/written material/patient instructed

## 2022-06-08 NOTE — PROGRESS NOTE ADULT - ASSESSMENT
Assessment:  The patient is a 67y Male who is s/p a laparoscopic low anterior resection with colovesicular fistula repair on 6/7.    Plan:  - Maintain Rao for 5 days  - Pain control as needed  - DVT ppx  - OOB and ambulating as tolerated  - F/u AM labs    Red Team Surgery   [id number] Assessment:  The patient is a 67y Male who is s/p a laparoscopic low anterior resection with colovesicular fistula repair on 6/7.    Plan:  - Maintain Rao for 5 days  - Pain control as needed  - DVT ppx  - OOB and ambulating as tolerated  - F/u AM labs  - D/c Rao   - UCath removed on rounds this AM  - advance Diet per ERP protocol  Red Team Surgery   [id number] Assessment:  The patient is a 67y Male who is s/p a laparoscopic low anterior resection with colovesicular fistula repair on 6/7.    Plan:  - Maintain Rao for 5 days  - Pain control as needed  - DVT ppx  - OOB and ambulating as tolerated  - F/u AM labs  - c/w Rao   - UCath removed on rounds this AM  - advance Diet per ERP protocol  Red Team Surgery   [id number]

## 2022-06-08 NOTE — DISCHARGE NOTE PROVIDER - HOSPITAL COURSE
68 YO M PMH HTN, diverticulitis (proven on CT scan ~8 years ago), recurrent UTIs over the last 1-1.5 years, CT scan revealed colovesical fistula confirmed by cystoscopy, colonoscopy revealed sigmoid diverticulosis & benign polyps s/p removal, presents to [facility] for LAPAROSCOPIC LOW ANTERIOR RESECTION ERAS 6/7/2022. On 6/7 pt underwent laparoscopic LAR, repair of colovesicular fistula. He tolerated the procedure well, was extubated and sent to PACU  in  stable condition. He remained hemodynamically stable and was transferred to a surgical floor. His pain was controlled by IV pain medications and then by PO pain medications. Diet was was advanced from clears to low fiber diet and tolerated it well.  He was evaluated by PT who determined he had no PT wounds.  He is being discharged home with a finley catheter and will follow-up with his urologist for cystogram and finley removal.  He is ambulating, tolerating a low fiber diet, and pain is controlled with oral pain medications.  Patient is stable for discharge home and will follow-up with Dr. Foster within 1-2 weeks and his urologist within 1 week.    66 YO M PMH HTN, diverticulitis (proven on CT scan ~8 years ago), recurrent UTIs over the last 1-1.5 years, CT scan revealed colovesical fistula confirmed by cystoscopy, colonoscopy revealed sigmoid diverticulosis & benign polyps s/p removal, presents [facility] for LAPAROSCOPIC LOW ANTERIOR RESECTION ERAS 6/7/2022. On 6/7 pt underwent laparoscopic LAR, repair of colovesicular fistula. He tolerated the procedure well, was extubated and sent to PACU  in  stable condition. He remained hemodynamically stable and was transferred to a surgical floor. His pain was controlled by IV pain medications and then by PO pain medications. Diet was was advanced from clears to low fiber diet and tolerated it well.  He was evaluated by PT who determined he had no PT wounds.  He is being discharged home with a finley catheter and will follow-up with his urologist for cystogram and finley removal.  He is ambulating, tolerating a low fiber diet, and pain is controlled with oral pain medications.  Patient is stable for discharge home and will follow-up with Dr. Foster within 1-2 weeks and his urologist within 1 week. Calculated Caprini score shows low risk for DVT, no DVT prophylaxis required on discharge. During hospitalization, pt had hypophosphatemia and was repleted with IV phosphate. Potassium was repleted during hospitalization for hypokalemia.

## 2022-06-08 NOTE — PROGRESS NOTE ADULT - SUBJECTIVE AND OBJECTIVE BOX
SURGERY DAILY PROGRESS NOTE:       SUBJECTIVE/ROS: No acute overnight events. Patient seen and examined at bedside during morning rounds.    OBJECTIVE:    Vital Signs Last 24 Hrs  T(C): 36.3 (08 Jun 2022 00:40), Max: 36.8 (07 Jun 2022 23:37)  T(F): 97.3 (08 Jun 2022 00:40), Max: 98.3 (07 Jun 2022 23:37)  HR: 76 (08 Jun 2022 00:40) (72 - 80)  BP: 121/68 (08 Jun 2022 00:40) (111/65 - 149/83)  BP(mean): 96 (07 Jun 2022 22:18) (84 - 98)  RR: 18 (08 Jun 2022 00:40) (14 - 18)  SpO2: 97% (08 Jun 2022 00:40) (92% - 100%)    I&O's Detail    07 Jun 2022 07:01  -  08 Jun 2022 01:35  --------------------------------------------------------  IN:    IV PiggyBack: 300 mL    Lactated Ringers: 120 mL    Oral Fluid: 70 mL  Total IN: 490 mL    OUT:    Indwelling Catheter - Urethral (mL): 280 mL  Total OUT: 280 mL    Total NET: 210 mL    Physical Exam:  General: NAD, resting comfortably in bed  Abdominal: soft, mild TTP, ND, midline dressings c/d/i    LABS:                        12.6   16.50 )-----------( 283      ( 07 Jun 2022 20:37 )             39.5     06-07    138  |  98  |  18  ----------------------------<  172<H>  3.3<L>   |  23  |  1.10    Ca    8.7      07 Jun 2022 20:37  Phos  3.9     06-07  Mg     2.1     06-07                           SURGERY DAILY PROGRESS NOTE:       SUBJECTIVE/ROS: No acute overnight events. Patient seen and examined at bedside during morning rounds. No acute events overnight. Denies passing flatus. Pain well-controlled.     OBJECTIVE:    Vital Signs Last 24 Hrs  T(C): 36.3 (08 Jun 2022 00:40), Max: 36.8 (07 Jun 2022 23:37)  T(F): 97.3 (08 Jun 2022 00:40), Max: 98.3 (07 Jun 2022 23:37)  HR: 76 (08 Jun 2022 00:40) (72 - 80)  BP: 121/68 (08 Jun 2022 00:40) (111/65 - 149/83)  BP(mean): 96 (07 Jun 2022 22:18) (84 - 98)  RR: 18 (08 Jun 2022 00:40) (14 - 18)  SpO2: 97% (08 Jun 2022 00:40) (92% - 100%)    I&O's Detail    07 Jun 2022 07:01  -  08 Jun 2022 01:35  --------------------------------------------------------  IN:    IV PiggyBack: 300 mL    Lactated Ringers: 120 mL    Oral Fluid: 70 mL  Total IN: 490 mL    OUT:    Indwelling Catheter - Urethral (mL): 280 mL  Total OUT: 280 mL    Total NET: 210 mL    Physical Exam:  General: NAD, resting comfortably in bed  Abdominal: soft, mild TTP, ND, midline dressings c/d/i    LABS:                        12.6   16.50 )-----------( 283      ( 07 Jun 2022 20:37 )             39.5     06-07    138  |  98  |  18  ----------------------------<  172<H>  3.3<L>   |  23  |  1.10    Ca    8.7      07 Jun 2022 20:37  Phos  3.9     06-07  Mg     2.1     06-07

## 2022-06-08 NOTE — DISCHARGE NOTE PROVIDER - NSDCMRMEDTOKEN_GEN_ALL_CORE_FT
amLODIPine 10 mg oral tablet: 1 tab(s) orally once a day  Cranberry oral capsule: 2 cap(s) orally once a day  losartan-hydrochlorothiazide 100 mg-25 mg oral tablet: 1 tab(s) orally once a day  Metoprolol Tartrate: 150 milligram(s) orally 2 times a day  tamsulosin 0.4 mg oral capsule: 1 cap(s) orally once a day   acetaminophen 500 mg oral tablet: 2 tab(s) orally every 6 hours  amLODIPine 10 mg oral tablet: 1 tab(s) orally once a day  Cranberry oral capsule: 2 cap(s) orally once a day  ibuprofen 400 mg oral tablet: 1 tab(s) orally every 6 hours  losartan-hydrochlorothiazide 100 mg-25 mg oral tablet: 1 tab(s) orally once a day  Metoprolol Tartrate: 150 milligram(s) orally 2 times a day  oxyCODONE 5 mg oral tablet: 1/2 to 1 tab(s) orally every 4 hours, As needed, Severe Pain (7 - 10) MDD:3  tamsulosin 0.4 mg oral capsule: 1 cap(s) orally once a day

## 2022-06-08 NOTE — DIETITIAN INITIAL EVALUATION ADULT - OTHER INFO
Weight: Pt reports he used to be very overweight > 5 years ago, reports weight was ~ 240lbs, states due to diet and lifestyle changes he was able to get weight down to ~ 185lbs. Current dosing weight is 178lbs (6/7).

## 2022-06-09 ENCOUNTER — TRANSCRIPTION ENCOUNTER (OUTPATIENT)
Age: 67
End: 2022-06-09

## 2022-06-09 VITALS
RESPIRATION RATE: 18 BRPM | DIASTOLIC BLOOD PRESSURE: 92 MMHG | SYSTOLIC BLOOD PRESSURE: 150 MMHG | HEART RATE: 70 BPM | TEMPERATURE: 98 F | OXYGEN SATURATION: 95 %

## 2022-06-09 LAB
ANION GAP SERPL CALC-SCNC: 13 MMOL/L — SIGNIFICANT CHANGE UP (ref 5–17)
BUN SERPL-MCNC: 18 MG/DL — SIGNIFICANT CHANGE UP (ref 7–23)
CALCIUM SERPL-MCNC: 8.8 MG/DL — SIGNIFICANT CHANGE UP (ref 8.4–10.5)
CHLORIDE SERPL-SCNC: 99 MMOL/L — SIGNIFICANT CHANGE UP (ref 96–108)
CO2 SERPL-SCNC: 24 MMOL/L — SIGNIFICANT CHANGE UP (ref 22–31)
CREAT SERPL-MCNC: 0.95 MG/DL — SIGNIFICANT CHANGE UP (ref 0.5–1.3)
EGFR: 87 ML/MIN/1.73M2 — SIGNIFICANT CHANGE UP
GLUCOSE SERPL-MCNC: 103 MG/DL — HIGH (ref 70–99)
HCT VFR BLD CALC: 31.6 % — LOW (ref 39–50)
HGB BLD-MCNC: 10.3 G/DL — LOW (ref 13–17)
MAGNESIUM SERPL-MCNC: 2.2 MG/DL — SIGNIFICANT CHANGE UP (ref 1.6–2.6)
MCHC RBC-ENTMCNC: 26.5 PG — LOW (ref 27–34)
MCHC RBC-ENTMCNC: 32.6 GM/DL — SIGNIFICANT CHANGE UP (ref 32–36)
MCV RBC AUTO: 81.4 FL — SIGNIFICANT CHANGE UP (ref 80–100)
NRBC # BLD: 0 /100 WBCS — SIGNIFICANT CHANGE UP (ref 0–0)
PHOSPHATE SERPL-MCNC: 2 MG/DL — LOW (ref 2.5–4.5)
PLATELET # BLD AUTO: 274 K/UL — SIGNIFICANT CHANGE UP (ref 150–400)
POTASSIUM SERPL-MCNC: 3.5 MMOL/L — SIGNIFICANT CHANGE UP (ref 3.5–5.3)
POTASSIUM SERPL-SCNC: 3.5 MMOL/L — SIGNIFICANT CHANGE UP (ref 3.5–5.3)
RBC # BLD: 3.88 M/UL — LOW (ref 4.2–5.8)
RBC # FLD: 16.2 % — HIGH (ref 10.3–14.5)
SODIUM SERPL-SCNC: 136 MMOL/L — SIGNIFICANT CHANGE UP (ref 135–145)
WBC # BLD: 10.11 K/UL — SIGNIFICANT CHANGE UP (ref 3.8–10.5)
WBC # FLD AUTO: 10.11 K/UL — SIGNIFICANT CHANGE UP (ref 3.8–10.5)

## 2022-06-09 PROCEDURE — U0005: CPT

## 2022-06-09 PROCEDURE — 85027 COMPLETE CBC AUTOMATED: CPT

## 2022-06-09 PROCEDURE — 82962 GLUCOSE BLOOD TEST: CPT

## 2022-06-09 PROCEDURE — C9803: CPT

## 2022-06-09 PROCEDURE — 80048 BASIC METABOLIC PNL TOTAL CA: CPT

## 2022-06-09 PROCEDURE — C1889: CPT

## 2022-06-09 PROCEDURE — C1758: CPT

## 2022-06-09 PROCEDURE — 84100 ASSAY OF PHOSPHORUS: CPT

## 2022-06-09 PROCEDURE — 83735 ASSAY OF MAGNESIUM: CPT

## 2022-06-09 PROCEDURE — U0003: CPT

## 2022-06-09 PROCEDURE — C9399: CPT

## 2022-06-09 PROCEDURE — C1769: CPT

## 2022-06-09 PROCEDURE — 88307 TISSUE EXAM BY PATHOLOGIST: CPT

## 2022-06-09 PROCEDURE — 36415 COLL VENOUS BLD VENIPUNCTURE: CPT

## 2022-06-09 RX ORDER — POTASSIUM PHOSPHATE, MONOBASIC POTASSIUM PHOSPHATE, DIBASIC 236; 224 MG/ML; MG/ML
30 INJECTION, SOLUTION INTRAVENOUS ONCE
Refills: 0 | Status: COMPLETED | OUTPATIENT
Start: 2022-06-09 | End: 2022-06-09

## 2022-06-09 RX ORDER — SODIUM,POTASSIUM PHOSPHATES 278-250MG
2 POWDER IN PACKET (EA) ORAL ONCE
Refills: 0 | Status: COMPLETED | OUTPATIENT
Start: 2022-06-09 | End: 2022-06-09

## 2022-06-09 RX ORDER — IBUPROFEN 200 MG
1 TABLET ORAL
Qty: 0 | Refills: 0 | DISCHARGE
Start: 2022-06-09

## 2022-06-09 RX ORDER — OXYCODONE HYDROCHLORIDE 5 MG/1
1 TABLET ORAL
Qty: 3 | Refills: 0
Start: 2022-06-09

## 2022-06-09 RX ORDER — ACETAMINOPHEN 500 MG
2 TABLET ORAL
Qty: 0 | Refills: 0 | DISCHARGE
Start: 2022-06-09

## 2022-06-09 RX ADMIN — Medication 400 MILLIGRAM(S): at 16:07

## 2022-06-09 RX ADMIN — Medication 1000 MILLIGRAM(S): at 03:10

## 2022-06-09 RX ADMIN — Medication 150 MILLIGRAM(S): at 05:06

## 2022-06-09 RX ADMIN — Medication 400 MILLIGRAM(S): at 05:36

## 2022-06-09 RX ADMIN — Medication 400 MILLIGRAM(S): at 05:06

## 2022-06-09 RX ADMIN — HEPARIN SODIUM 5000 UNIT(S): 5000 INJECTION INTRAVENOUS; SUBCUTANEOUS at 05:07

## 2022-06-09 RX ADMIN — Medication 1000 MILLIGRAM(S): at 10:23

## 2022-06-09 RX ADMIN — Medication 1000 MILLIGRAM(S): at 03:40

## 2022-06-09 RX ADMIN — Medication 2 PACKET(S): at 10:21

## 2022-06-09 RX ADMIN — MAGNESIUM OXIDE 400 MG ORAL TABLET 1000 MILLIGRAM(S): 241.3 TABLET ORAL at 10:22

## 2022-06-09 RX ADMIN — POTASSIUM PHOSPHATE, MONOBASIC POTASSIUM PHOSPHATE, DIBASIC 83.33 MILLIMOLE(S): 236; 224 INJECTION, SOLUTION INTRAVENOUS at 10:21

## 2022-06-09 NOTE — PROGRESS NOTE ADULT - ASSESSMENT
Assessment:  The patient is a 67y Male who is s/p a laparoscopic low anterior resection with colovesicular fistula repair on 6/7.    Plan:  - Maintain Rao for 5 days  - Pain control as needed  - DVT ppx  - OOB and ambulating as tolerated  - F/u AM labs  - c/w Rao   - advance Diet per ERP protocol    Red Team Surgery   [id number] Assessment:  The patient is a 67y Male who is s/p a laparoscopic low anterior resection with colovesicular fistula repair on 6/7.    Plan:    - LRD  - Rao remains, keep as outpatient  - Pain control as needed  - DVT ppx  - OOB and ambulating as tolerated  - F/u AM labs  - Likely d/c today        Red Team Surgery   [id number]

## 2022-06-09 NOTE — DISCHARGE NOTE NURSING/CASE MANAGEMENT/SOCIAL WORK - NSDCPNINST_GEN_ALL_CORE
call MD // go to ER:  temperature, nausea, vomiting, issues with finley catheter; check sites daily for redness, warmth, swelling, bleeding, drainage with foul odor

## 2022-06-09 NOTE — PROGRESS NOTE ADULT - SUBJECTIVE AND OBJECTIVE BOX
Surgery Progress Note     Subjective/24hour Events: Patient seen and examined at the bedside this morning. No acute events overnight. Pain controlled.     Vital Signs:  Vital Signs Last 24 Hrs  T(C): 36.7 (09 Jun 2022 00:44), Max: 36.8 (08 Jun 2022 01:41)  T(F): 98.1 (09 Jun 2022 00:44), Max: 98.3 (08 Jun 2022 01:41)  HR: 75 (09 Jun 2022 00:44) (66 - 77)  BP: 159/82 (09 Jun 2022 00:44) (136/73 - 159/82)  BP(mean): --  RR: 18 (09 Jun 2022 00:44) (18 - 18)  SpO2: 97% (09 Jun 2022 00:44) (95% - 97%)        I&O's Detail    07 Jun 2022 07:01  -  08 Jun 2022 07:00  --------------------------------------------------------  IN:    IV PiggyBack: 300 mL    Lactated Ringers: 120 mL    Oral Fluid: 70 mL  Total IN: 490 mL    OUT:    Indwelling Catheter - Urethral (mL): 630 mL  Total OUT: 630 mL    Total NET: -140 mL      08 Jun 2022 07:01  -  09 Jun 2022 01:18  --------------------------------------------------------  IN:    Oral Fluid: 600 mL  Total IN: 600 mL    OUT:    Indwelling Catheter - Urethral (mL): 850 mL  Total OUT: 850 mL    Total NET: -250 mL            Physical Exam:  General: NAD, resting comfortably in bed  Abdominal: soft, mild TTP, ND, midline dressings c/d/i    Labs:    06-08    136  |  97  |  19  ----------------------------<  125<H>  3.4<L>   |  23  |  1.03    Ca    8.7      08 Jun 2022 06:41  Phos  4.7     06-08  Mg     2.1     06-08      CAPILLARY BLOOD GLUCOSE      POCT Blood Glucose.: 155 mg/dL (08 Jun 2022 08:14)                            11.2   13.23 )-----------( 315      ( 08 Jun 2022 06:42 )             34.5            Surgery Progress Note     Subjective/24hour Events: Patient seen and examined at the bedside this morning. POD 2. VSS, AF. Rao 350cc. +/+ bowl function  Vital Signs:  Vital Signs Last 24 Hrs  T(C): 36.7 (09 Jun 2022 00:44), Max: 36.8 (08 Jun 2022 01:41)  T(F): 98.1 (09 Jun 2022 00:44), Max: 98.3 (08 Jun 2022 01:41)  HR: 75 (09 Jun 2022 00:44) (66 - 77)  BP: 159/82 (09 Jun 2022 00:44) (136/73 - 159/82)  BP(mean): --  RR: 18 (09 Jun 2022 00:44) (18 - 18)  SpO2: 97% (09 Jun 2022 00:44) (95% - 97%)        I&O's Detail    07 Jun 2022 07:01  -  08 Jun 2022 07:00  --------------------------------------------------------  IN:    IV PiggyBack: 300 mL    Lactated Ringers: 120 mL    Oral Fluid: 70 mL  Total IN: 490 mL    OUT:    Indwelling Catheter - Urethral (mL): 630 mL  Total OUT: 630 mL    Total NET: -140 mL      08 Jun 2022 07:01  -  09 Jun 2022 01:18  --------------------------------------------------------  IN:    Oral Fluid: 600 mL  Total IN: 600 mL    OUT:    Indwelling Catheter - Urethral (mL): 850 mL  Total OUT: 850 mL    Total NET: -250 mL            Physical Exam:  General: NAD, resting comfortably in bed  Abdominal: soft, mild TTP, ND, midline dressings c/d/i  LETICIA Rao in    Labs:    06-08    136  |  97  |  19  ----------------------------<  125<H>  3.4<L>   |  23  |  1.03    Ca    8.7      08 Jun 2022 06:41  Phos  4.7     06-08  Mg     2.1     06-08      CAPILLARY BLOOD GLUCOSE      POCT Blood Glucose.: 155 mg/dL (08 Jun 2022 08:14)                            11.2   13.23 )-----------( 315      ( 08 Jun 2022 06:42 )             34.5

## 2022-06-09 NOTE — DISCHARGE NOTE NURSING/CASE MANAGEMENT/SOCIAL WORK - NSDCPEFALRISK_GEN_ALL_CORE
For information on Fall & Injury Prevention, visit: [email or web address] OR  [email or web address] OR  https://www.cdc.gov/steadi/patient.html

## 2022-06-09 NOTE — DISCHARGE NOTE NURSING/CASE MANAGEMENT/SOCIAL WORK - PATIENT PORTAL LINK FT
You can access the FollowMyHealth Patient Portal offered by [facility] by registering at the following website: [email or web address]. By joining [facility]’s FollowMyHealth portal, you will also be able to view your health information using other applications (apps) compatible with our system.

## 2022-06-10 ENCOUNTER — NON-APPOINTMENT (OUTPATIENT)
Age: 67
End: 2022-06-10

## 2022-06-10 PROBLEM — K63.5 POLYP OF COLON: Chronic | Status: ACTIVE | Noted: 2022-05-23

## 2022-06-10 PROBLEM — I10 ESSENTIAL (PRIMARY) HYPERTENSION: Chronic | Status: ACTIVE | Noted: 2022-05-23

## 2022-06-10 PROBLEM — Z87.440 PERSONAL HISTORY OF URINARY (TRACT) INFECTIONS: Chronic | Status: ACTIVE | Noted: 2022-05-23

## 2022-06-10 PROBLEM — K64.9 UNSPECIFIED HEMORRHOIDS: Chronic | Status: ACTIVE | Noted: 2022-05-23

## 2022-06-10 PROBLEM — Z87.438 PERSONAL HISTORY OF OTHER DISEASES OF MALE GENITAL ORGANS: Chronic | Status: ACTIVE | Noted: 2022-05-23

## 2022-06-15 ENCOUNTER — APPOINTMENT (OUTPATIENT)
Dept: CT IMAGING | Facility: CLINIC | Age: 67
End: 2022-06-15
Payer: MEDICARE

## 2022-06-15 ENCOUNTER — APPOINTMENT (OUTPATIENT)
Dept: COLORECTAL SURGERY | Facility: CLINIC | Age: 67
End: 2022-06-15
Payer: MEDICARE

## 2022-06-15 ENCOUNTER — OUTPATIENT (OUTPATIENT)
Dept: OUTPATIENT SERVICES | Facility: HOSPITAL | Age: 67
LOS: 1 days | End: 2022-06-15
Payer: MEDICARE

## 2022-06-15 ENCOUNTER — RESULT REVIEW (OUTPATIENT)
Age: 67
End: 2022-06-15

## 2022-06-15 DIAGNOSIS — Z98.890 OTHER SPECIFIED POSTPROCEDURAL STATES: Chronic | ICD-10-CM

## 2022-06-15 DIAGNOSIS — Z00.8 ENCOUNTER FOR OTHER GENERAL EXAMINATION: ICD-10-CM

## 2022-06-15 PROCEDURE — 99024 POSTOP FOLLOW-UP VISIT: CPT

## 2022-06-15 PROCEDURE — 72192 CT PELVIS W/O DYE: CPT | Mod: MH

## 2022-06-15 PROCEDURE — 72192 CT PELVIS W/O DYE: CPT | Mod: 26,MH

## 2022-06-17 LAB — SURGICAL PATHOLOGY STUDY: SIGNIFICANT CHANGE UP

## 2022-06-22 ENCOUNTER — APPOINTMENT (OUTPATIENT)
Dept: COLORECTAL SURGERY | Facility: CLINIC | Age: 67
End: 2022-06-22

## 2022-06-24 NOTE — HISTORY OF PRESENT ILLNESS
[FreeTextEntry1] : Pleasant 67-year-old gentleman presents for his first postoperative visit.\par \par The patient is approximately 2-1/2 weeks status post takedown and repair of a significant colovesical fistula and laparoscopic anterior resection for complicated diverticulitis. The patient made a rapid and uneventful postoperative recuperation and was discharged home on approximately postoperative day #2.\par \par Patient looks and feels quite well. He denies temperature elevation or rectal bleeding. He is tolerating diet and moving his bowels. The patient had a CT cystogram today which revealed no ongoing leak from the bladder.\par \par Physical examination reveals a tall, thin gentleman. His abdomen is soft, nontender and nondistended. His trocar site and specimen extraction site in the midline are well-healed with no evidence of infection. Surgical staples were removed. The patient's Rao catheter was removed.\par \par He was asked to remain a low-residue diet and avoid strenuous activity. He was advised to return to the emergency room if he cannot void in 6-8 hours. I will see him in 4 weeks for a sigmoidoscopy under sedation.

## 2022-07-12 DIAGNOSIS — Z12.11 ENCOUNTER FOR SCREENING FOR MALIGNANT NEOPLASM OF COLON: ICD-10-CM

## 2022-07-15 LAB — SARS-COV-2 N GENE NPH QL NAA+PROBE: NOT DETECTED

## 2022-07-18 ENCOUNTER — APPOINTMENT (OUTPATIENT)
Dept: COLORECTAL SURGERY | Facility: CLINIC | Age: 67
End: 2022-07-18

## 2022-07-18 PROCEDURE — 45330 DIAGNOSTIC SIGMOIDOSCOPY: CPT | Mod: 58

## 2022-12-05 NOTE — DIETITIAN INITIAL EVALUATION ADULT - ENERGY INTAKE
PDMP reviewed, refill provided.     Adequate (%) Pt reports good tolerance to clear liquid diet, consumed jello and broth. No nausea currently, burping but no passing of gas per pt report. Pt receptive to diet education in anticipation of diet advancement (see below for details).

## 2024-11-21 ENCOUNTER — NON-APPOINTMENT (OUTPATIENT)
Age: 69
End: 2024-11-21

## 2024-12-02 ENCOUNTER — APPOINTMENT (OUTPATIENT)
Dept: ORTHOPEDIC SURGERY | Facility: CLINIC | Age: 69
End: 2024-12-02

## 2024-12-02 VITALS — HEIGHT: 73 IN | WEIGHT: 190 LBS | BODY MASS INDEX: 25.18 KG/M2

## 2024-12-02 DIAGNOSIS — S62.309A UNSPECIFIED FRACTURE OF UNSPECIFIED METACARPAL BONE, INITIAL ENCOUNTER FOR CLOSED FRACTURE: ICD-10-CM

## 2024-12-02 PROCEDURE — 99204 OFFICE O/P NEW MOD 45 MIN: CPT | Mod: 57

## 2024-12-02 PROCEDURE — 26600 TREAT METACARPAL FRACTURE: CPT

## 2024-12-24 PROBLEM — F10.90 ALCOHOL USE: Status: ACTIVE | Noted: 2022-05-18

## 2024-12-30 ENCOUNTER — APPOINTMENT (OUTPATIENT)
Dept: ORTHOPEDIC SURGERY | Facility: CLINIC | Age: 69
End: 2024-12-30
Payer: OTHER MISCELLANEOUS

## 2024-12-30 VITALS — HEIGHT: 73 IN | BODY MASS INDEX: 25.18 KG/M2 | WEIGHT: 190 LBS

## 2024-12-30 DIAGNOSIS — S62.309A UNSPECIFIED FRACTURE OF UNSPECIFIED METACARPAL BONE, INITIAL ENCOUNTER FOR CLOSED FRACTURE: ICD-10-CM

## 2024-12-30 PROCEDURE — 73130 X-RAY EXAM OF HAND: CPT | Mod: RT

## 2024-12-30 PROCEDURE — 99024 POSTOP FOLLOW-UP VISIT: CPT

## 2025-01-05 NOTE — DISCHARGE NOTE NURSING/CASE MANAGEMENT/SOCIAL WORK - HISTORY OF COVID-19 VACCINATION
Discharge instructions given, pt verbalized understanding. All questions answered. Pt ambulatory to Triage.      Yes

## 2025-02-10 ENCOUNTER — APPOINTMENT (OUTPATIENT)
Dept: ORTHOPEDIC SURGERY | Facility: CLINIC | Age: 70
End: 2025-02-10

## 2025-02-11 ENCOUNTER — NON-APPOINTMENT (OUTPATIENT)
Age: 70
End: 2025-02-11

## (undated) DEVICE — Device

## (undated) DEVICE — TROCAR APPLIED MEDICAL KII BALLOON BLUNT TIP 12MM X 100MM

## (undated) DEVICE — FOLEY TRAY 16FR 5CC LTX UMETER CLOSED

## (undated) DEVICE — GLV 8 PROTEXIS (WHITE)

## (undated) DEVICE — GLV 8.5 PROTEXIS (WHITE)

## (undated) DEVICE — DRSG TAPE UMBILICAL COTTON 2" X 30 X 1/8"

## (undated) DEVICE — PACK MAJOR ABDOMINAL SUPINE

## (undated) DEVICE — DRAPE LEGGINGS XL

## (undated) DEVICE — SUT SURGIPRO 0 30" GS-22

## (undated) DEVICE — GLV 6.5 PROTEXIS (WHITE)

## (undated) DEVICE — BLADE SCALPEL SAFETYLOCK #10

## (undated) DEVICE — VALVE YELLOW PORT SEAL PLUS 5MM

## (undated) DEVICE — PREP BETADINE KIT

## (undated) DEVICE — BLADE SCALPEL SAFETYLOCK #15

## (undated) DEVICE — WARMING BLANKET UPPER ADULT

## (undated) DEVICE — SOL IRR POUR H2O 250ML

## (undated) DEVICE — DRSG OPSITE 13.75 X 4"

## (undated) DEVICE — VENODYNE/SCD SLEEVE CALF MEDIUM

## (undated) DEVICE — SYR LUER LOK 30CC

## (undated) DEVICE — WARMING BLANKET FULL ADULT

## (undated) DEVICE — LIGASURE IMPACT

## (undated) DEVICE — GLV 7 PROTEXIS (WHITE)

## (undated) DEVICE — SUT CHROMIC 3-0 30" V-20

## (undated) DEVICE — ADAPTER URETHRAL CATH CONNECTING

## (undated) DEVICE — DRAPE 1/2 SHEET 40X57"

## (undated) DEVICE — GLV 7.5 PROTEXIS (WHITE)

## (undated) DEVICE — SUT MAXON 1 36" GS-24

## (undated) DEVICE — GELPORT LAPAROSCOPIC SYSTEM

## (undated) DEVICE — LIGASURE BLUNT TIP 37CM

## (undated) DEVICE — DRSG OPSITE 2.5 X 2"

## (undated) DEVICE — SUT CHROMIC 0 36" GS-21

## (undated) DEVICE — PACK CYSTO

## (undated) DEVICE — VENODYNE/SCD SLEEVE CALF LARGE

## (undated) DEVICE — OSTOMY KIT 2-PIECE 2.25" NS (RED)

## (undated) DEVICE — TUBING RANGER FLUID IRRIGATION SET DISP

## (undated) DEVICE — POSITIONER FOAM EGG CRATE ULNAR 2PCS (PINK)

## (undated) DEVICE — DRAPE TOWEL BLUE 17" X 24"

## (undated) DEVICE — ELCTR BOVIE PENCIL SMOKE EVACUATION

## (undated) DEVICE — SUT POLYSORB 0 36" GU-46

## (undated) DEVICE — FOLEY CATH 2-WAY 28FR 30CC SILICONE

## (undated) DEVICE — TUBING INSUFFLATION LAP FILTER 10FT

## (undated) DEVICE — SUT CHROMIC 1 30" GS-21

## (undated) DEVICE — GOWN TRIMAX LG

## (undated) DEVICE — SOL IRR BAG H2O 3000ML

## (undated) DEVICE — TUBING STRYKEFLOW II SUCTION / IRRIGATOR

## (undated) DEVICE — SUT POLYSORB 3-0 30" V-20 UNDYED

## (undated) DEVICE — PACK COLON BUNDLE

## (undated) DEVICE — DRSG TELFA 3 X 8

## (undated) DEVICE — D HELP - CLEARVIEW CLEARIFY SYSTEM

## (undated) DEVICE — SOL IRR POUR NS 0.9% 500ML

## (undated) DEVICE — SPECIMEN CONTAINER 100ML

## (undated) DEVICE — SYR ASEPTO

## (undated) DEVICE — TAPE SILK 3"

## (undated) DEVICE — ACMI SELF-SEALING SEAL UP TO 7FR

## (undated) DEVICE — DRAPE GENERAL ENDOSCOPY